# Patient Record
Sex: MALE | Race: WHITE
[De-identification: names, ages, dates, MRNs, and addresses within clinical notes are randomized per-mention and may not be internally consistent; named-entity substitution may affect disease eponyms.]

---

## 2017-07-14 ENCOUNTER — HOSPITAL ENCOUNTER (OUTPATIENT)
Dept: HOSPITAL 61 - PCVCIMAG | Age: 72
Discharge: HOME | End: 2017-07-14
Attending: INTERNAL MEDICINE
Payer: OTHER GOVERNMENT

## 2017-07-14 DIAGNOSIS — I10: ICD-10-CM

## 2017-07-14 DIAGNOSIS — Z79.899: ICD-10-CM

## 2017-07-14 DIAGNOSIS — I74.5: Primary | ICD-10-CM

## 2017-07-14 DIAGNOSIS — Z79.82: ICD-10-CM

## 2017-07-14 DIAGNOSIS — Z87.891: ICD-10-CM

## 2017-07-14 DIAGNOSIS — I25.10: ICD-10-CM

## 2017-07-14 DIAGNOSIS — I71.4: ICD-10-CM

## 2017-07-14 DIAGNOSIS — I25.5: ICD-10-CM

## 2017-07-14 DIAGNOSIS — R00.1: ICD-10-CM

## 2017-07-14 PROCEDURE — 80061 LIPID PANEL: CPT

## 2017-07-14 PROCEDURE — G0463 HOSPITAL OUTPT CLINIC VISIT: HCPCS

## 2017-07-14 PROCEDURE — 93923 UPR/LXTR ART STDY 3+ LVLS: CPT

## 2017-07-14 PROCEDURE — 93978 VASCULAR STUDY: CPT

## 2017-07-14 NOTE — PCVCIMAG
EXAM: AORTOILIAC DUPLEX



INDICATION: Peripheral arterial disease 



FINDINGS: 



AORTA: Suprarenal aorta measures maximum diameter of 2.7 cm. There is

a fusiform infrarenal aortic aneurysm. The infrarenal aorta measures

maximum diameter of 3.0 cm. No aortic stenosis.



RIGHT COMMON ILIAC ARTERY: Maximum diameter is 1.0 cm. No significant

stenosis.



RIGHT EXTERNAL ILIAC ARTERY:  Probable subtotal occlusion proximal

vessel.



LEFT COMMON ILIAC ARTERY: Maximum diameter is 1.0 cm.  No significant

stenosis.



LEFT EXTERNAL ILIAC ARTERY:  No significant stenosis.



IMPRESSION: 

3.0 cm infrarenal abdominal aortic aneurysm.

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

No left iliac stenosis.





LOC:[id number]

## 2017-07-14 NOTE — PCVCIMAG
EXAM: NONINVASIVE ARTERIAL EXAMINATION OF BOTH LOWER EXTREMITIES

INCLUDING PRE AND POST EXERCISE PRESSURE MEASUREMENTS AND DOPPLER

WAVEFORMS



INDICATION: Peripheral Arterial Disease. Leg pain.



FINDINGS: 

Right Brachial: 140 mm Hg.

Right Dorsalis Pedis: 138 mm Hg.

Right Posterior Tibial: 153 mm Hg.

Right EVELYN = 1.09.



Left Brachial: 137 mm Hg.

Left Dorsalis Pedis: 155 mm Hg.

Left Posterior Tibial: 146 mm Hg.

Left EVELYN = 1.11.



Post Exercise:

Right Brachial  155 mm Hg.

Right Posterior Tibial:  158 mm Hg.

Left Dorsalis Pedis:  159 mm Hg.

Right EVELYN =  1.02.

Left EVELYN =  1.03.



IMPRESSION: 

No resting ischemia in the right lower extremity.

No exercise induced ischemia in the right lower extremity.

No resting ischemia in the left lower extremity.

No exercise induced ischemia in the left lower extremity.



These findings indicate the questionable area of subtotal occlusion in

the proximal left external iliac artery seen on aortoiliac duplex

today was artifactual.



LOC:[id number]

## 2018-07-06 ENCOUNTER — HOSPITAL ENCOUNTER (OUTPATIENT)
Dept: HOSPITAL 61 - PCVCIMAG | Age: 73
Discharge: HOME | End: 2018-07-06
Attending: INTERNAL MEDICINE
Payer: OTHER GOVERNMENT

## 2018-07-06 DIAGNOSIS — I10: ICD-10-CM

## 2018-07-06 DIAGNOSIS — I71.4: Primary | ICD-10-CM

## 2018-07-06 DIAGNOSIS — Z95.5: ICD-10-CM

## 2018-07-06 DIAGNOSIS — I25.10: ICD-10-CM

## 2018-07-06 DIAGNOSIS — Z87.891: ICD-10-CM

## 2018-07-06 DIAGNOSIS — I25.2: ICD-10-CM

## 2018-07-06 DIAGNOSIS — J44.9: ICD-10-CM

## 2018-07-06 DIAGNOSIS — R06.09: ICD-10-CM

## 2018-07-06 PROCEDURE — 93978 VASCULAR STUDY: CPT

## 2018-07-06 PROCEDURE — 93351 STRESS TTE COMPLETE: CPT

## 2018-07-06 PROCEDURE — 93325 DOPPLER ECHO COLOR FLOW MAPG: CPT

## 2019-08-06 ENCOUNTER — HOSPITAL ENCOUNTER (OUTPATIENT)
Dept: HOSPITAL 61 - PCVCCLINIC | Age: 74
Discharge: HOME | End: 2019-08-06
Attending: INTERNAL MEDICINE
Payer: OTHER GOVERNMENT

## 2019-08-06 DIAGNOSIS — I25.10: ICD-10-CM

## 2019-08-06 DIAGNOSIS — R53.83: ICD-10-CM

## 2019-08-06 DIAGNOSIS — R06.02: ICD-10-CM

## 2019-08-06 DIAGNOSIS — R07.9: Primary | ICD-10-CM

## 2019-08-06 DIAGNOSIS — R78.5: ICD-10-CM

## 2019-08-06 DIAGNOSIS — R68.84: ICD-10-CM

## 2019-08-06 DIAGNOSIS — I10: ICD-10-CM

## 2019-08-06 DIAGNOSIS — I71.4: ICD-10-CM

## 2019-08-06 DIAGNOSIS — I25.5: ICD-10-CM

## 2019-08-06 DIAGNOSIS — I65.23: ICD-10-CM

## 2019-08-06 PROCEDURE — G0463 HOSPITAL OUTPT CLINIC VISIT: HCPCS

## 2019-08-06 PROCEDURE — 93005 ELECTROCARDIOGRAM TRACING: CPT

## 2019-08-08 ENCOUNTER — HOSPITAL ENCOUNTER (OUTPATIENT)
Dept: HOSPITAL 35 - CATH | Age: 74
Setting detail: OBSERVATION
LOS: 1 days | Discharge: HOME | End: 2019-08-09
Attending: INTERNAL MEDICINE | Admitting: INTERNAL MEDICINE
Payer: OTHER GOVERNMENT

## 2019-08-08 VITALS — DIASTOLIC BLOOD PRESSURE: 76 MMHG | SYSTOLIC BLOOD PRESSURE: 141 MMHG

## 2019-08-08 VITALS — DIASTOLIC BLOOD PRESSURE: 82 MMHG | SYSTOLIC BLOOD PRESSURE: 144 MMHG

## 2019-08-08 VITALS — DIASTOLIC BLOOD PRESSURE: 71 MMHG | SYSTOLIC BLOOD PRESSURE: 134 MMHG

## 2019-08-08 VITALS — DIASTOLIC BLOOD PRESSURE: 79 MMHG | SYSTOLIC BLOOD PRESSURE: 135 MMHG

## 2019-08-08 VITALS — DIASTOLIC BLOOD PRESSURE: 83 MMHG | SYSTOLIC BLOOD PRESSURE: 128 MMHG

## 2019-08-08 VITALS — SYSTOLIC BLOOD PRESSURE: 114 MMHG | DIASTOLIC BLOOD PRESSURE: 94 MMHG

## 2019-08-08 VITALS — SYSTOLIC BLOOD PRESSURE: 159 MMHG | DIASTOLIC BLOOD PRESSURE: 70 MMHG

## 2019-08-08 VITALS — DIASTOLIC BLOOD PRESSURE: 80 MMHG | SYSTOLIC BLOOD PRESSURE: 141 MMHG

## 2019-08-08 VITALS — SYSTOLIC BLOOD PRESSURE: 141 MMHG | DIASTOLIC BLOOD PRESSURE: 82 MMHG

## 2019-08-08 VITALS — BODY MASS INDEX: 27.97 KG/M2 | WEIGHT: 174 LBS | HEIGHT: 65.98 IN

## 2019-08-08 VITALS — DIASTOLIC BLOOD PRESSURE: 91 MMHG | SYSTOLIC BLOOD PRESSURE: 135 MMHG

## 2019-08-08 VITALS — SYSTOLIC BLOOD PRESSURE: 135 MMHG | DIASTOLIC BLOOD PRESSURE: 79 MMHG

## 2019-08-08 VITALS — SYSTOLIC BLOOD PRESSURE: 135 MMHG | DIASTOLIC BLOOD PRESSURE: 96 MMHG

## 2019-08-08 VITALS — SYSTOLIC BLOOD PRESSURE: 147 MMHG | DIASTOLIC BLOOD PRESSURE: 72 MMHG

## 2019-08-08 VITALS — SYSTOLIC BLOOD PRESSURE: 137 MMHG | DIASTOLIC BLOOD PRESSURE: 86 MMHG

## 2019-08-08 VITALS — DIASTOLIC BLOOD PRESSURE: 81 MMHG | SYSTOLIC BLOOD PRESSURE: 154 MMHG

## 2019-08-08 VITALS — DIASTOLIC BLOOD PRESSURE: 86 MMHG | SYSTOLIC BLOOD PRESSURE: 137 MMHG

## 2019-08-08 DIAGNOSIS — I10: ICD-10-CM

## 2019-08-08 DIAGNOSIS — Z95.5: ICD-10-CM

## 2019-08-08 DIAGNOSIS — I71.4: ICD-10-CM

## 2019-08-08 DIAGNOSIS — Z79.82: ICD-10-CM

## 2019-08-08 DIAGNOSIS — I25.10: Primary | ICD-10-CM

## 2019-08-08 DIAGNOSIS — I25.5: ICD-10-CM

## 2019-08-08 DIAGNOSIS — Z79.899: ICD-10-CM

## 2019-08-08 DIAGNOSIS — E78.5: ICD-10-CM

## 2019-08-08 LAB
ANION GAP SERPL CALC-SCNC: 7 MMOL/L (ref 7–16)
BUN SERPL-MCNC: 11 MG/DL (ref 7–18)
CALCIUM SERPL-MCNC: 9.9 MG/DL (ref 8.5–10.1)
CHLORIDE SERPL-SCNC: 104 MMOL/L (ref 98–107)
CO2 SERPL-SCNC: 29 MMOL/L (ref 21–32)
CREAT SERPL-MCNC: 1 MG/DL (ref 0.7–1.3)
ERYTHROCYTE [DISTWIDTH] IN BLOOD BY AUTOMATED COUNT: 12.9 % (ref 10.5–14.5)
GLUCOSE SERPL-MCNC: 116 MG/DL (ref 74–106)
HCT VFR BLD CALC: 42 % (ref 42–52)
HGB BLD-MCNC: 14.6 GM/DL (ref 14–18)
MCH RBC QN AUTO: 31.9 PG (ref 26–34)
MCHC RBC AUTO-ENTMCNC: 34.7 G/DL (ref 28–37)
MCV RBC: 91.9 FL (ref 80–100)
PLATELET # BLD: 199 THOU/UL (ref 150–400)
POTASSIUM SERPL-SCNC: 3.7 MMOL/L (ref 3.5–5.1)
RBC # BLD AUTO: 4.57 MIL/UL (ref 4.5–6)
SODIUM SERPL-SCNC: 140 MMOL/L (ref 136–145)
WBC # BLD AUTO: 8.1 THOU/UL (ref 4–11)

## 2019-08-09 VITALS — DIASTOLIC BLOOD PRESSURE: 70 MMHG | SYSTOLIC BLOOD PRESSURE: 130 MMHG

## 2019-08-09 VITALS — SYSTOLIC BLOOD PRESSURE: 134 MMHG | DIASTOLIC BLOOD PRESSURE: 74 MMHG

## 2019-08-09 VITALS — SYSTOLIC BLOOD PRESSURE: 135 MMHG | DIASTOLIC BLOOD PRESSURE: 77 MMHG

## 2019-08-09 VITALS — DIASTOLIC BLOOD PRESSURE: 74 MMHG | SYSTOLIC BLOOD PRESSURE: 134 MMHG

## 2019-08-09 LAB
ALBUMIN SERPL-MCNC: 3.3 G/DL (ref 3.4–5)
ALT SERPL-CCNC: 30 U/L (ref 30–65)
ANION GAP SERPL CALC-SCNC: 12 MMOL/L (ref 7–16)
AST SERPL-CCNC: 22 U/L (ref 15–37)
BILIRUB SERPL-MCNC: 1.9 MG/DL
BUN SERPL-MCNC: 12 MG/DL (ref 7–18)
CALCIUM SERPL-MCNC: 8.5 MG/DL (ref 8.5–10.1)
CHLORIDE SERPL-SCNC: 107 MMOL/L (ref 98–107)
CO2 SERPL-SCNC: 22 MMOL/L (ref 21–32)
CREAT SERPL-MCNC: 0.9 MG/DL (ref 0.7–1.3)
ERYTHROCYTE [DISTWIDTH] IN BLOOD BY AUTOMATED COUNT: 12.5 % (ref 10.5–14.5)
GLUCOSE SERPL-MCNC: 95 MG/DL (ref 74–106)
HCT VFR BLD CALC: 39 % (ref 42–52)
HGB BLD-MCNC: 13.8 GM/DL (ref 14–18)
MCH RBC QN AUTO: 32.1 PG (ref 26–34)
MCHC RBC AUTO-ENTMCNC: 35.5 G/DL (ref 28–37)
MCV RBC: 90.4 FL (ref 80–100)
PLATELET # BLD: 187 THOU/UL (ref 150–400)
POTASSIUM SERPL-SCNC: 3.6 MMOL/L (ref 3.5–5.1)
PROT SERPL-MCNC: 6.8 G/DL (ref 6.4–8.2)
RBC # BLD AUTO: 4.31 MIL/UL (ref 4.5–6)
SODIUM SERPL-SCNC: 141 MMOL/L (ref 136–145)
TROPONIN I SERPL-MCNC: 0.08 NG/ML (ref ?–0.06)
WBC # BLD AUTO: 8.7 THOU/UL (ref 4–11)

## 2019-08-09 NOTE — EKG
[place] [address] [facility] [place]  [address]
Phone:  [phone]                    ELECTROCARDIOGRAM REPORT      
_______________________________________________________________________________
 
Name:       DARLENE SUERO                 Room #:         217-[facility].R.#:      [id number]     Account #:      [id number]  
Admission:  19    Attend Phys:    Thai Morgan MD,
Discharge:  19    Date of Birth:  [birth year]  
                                                          Report #: [id number]
   [id number]
_______________________________________________________________________________
THIS REPORT FOR:   //name//                          
 
                          [facility]
                                       
Test Date:    2019               Test Time:    07:46:33
Pat Name:     DARLENE SUERO              Department:   
Patient ID:   [id number]            Room:         217 
Gender:       M                        Technician:   [email or web address]
:          1945               Requested By: Thai Morgan
Order Number: [id number] MD:   Chester Manuel
                                 Measurements
Intervals                              Axis          
Rate:         54                       P:            -7
DC:           172                      QRS:          25
QRSD:         93                       T:            22
QT:           456                                    
QTc:          433                                    
                           Interpretive Statements
Sinus rhythm
Compared to ECG 2011 07:36:49
Myocardial infarct finding no longer present
 
Electronically Signed On 2019 13:43:58 CDT by Chester Manuel
[email or web address]
 
 
 
 
 
 
 
 
 
 
 
 
 
 
 
 
 
 
 
  <ELECTRONICALLY SIGNED>
   By: Chester Manuel MD               
  19     1343
D: 08746                           _____________________________________
T: 19                           Chester Manuel MD                 /MARCY

## 2019-08-13 NOTE — CATHLAB
[facility]
[address]
[place]  [address]
Phone:  [phone]                    INVASIVE PROCEDURE REPORT     
_______________________________________________________________________________
 
Name:            DARLENE SUERO                 Room #:        217-P       [facility] [place]
M.R.#:           [id number]          Account #:     [id number]  
Admission:       08/08/19         Attend Phys:   Thai Morgan,
Discharge:    08/09/19      Date of Birth: [birth year]  
Date of Service: 08/13/19 1703               Report #:      [id number]
        [id number]
_______________________________________________________________________________
THIS REPORT FOR:   //name//                          
 
 
--------------- APPROVED REPORT --------------
 
 
Study performed:  08/08/2019 07:46:39
 
Patient Details
The patient is a 73 year-old male
 
Event Personnel
Thai Morgan  Interventional Cardiologist, Sylvester Franklin RTR 
Hermelinda Rooney Jeff RN RN, Bonnie Clements RTR, RCIS 
Monitor
 
Procedures Performed
Left Heart Cath w/or w/o Coronaries [id number] [facility] w/wo Plasty 
Single LAD [id number] Renal Bilateral Peripheral Angiography [id number] 
CVRENALBIL
 
Indication
Chest pain
 
Procedure Narrative
The Right Groin^ was infiltrated with 1% Lidocaine subcutaneous 
anesthesia. A PINNACLE 6FR TIF Sheath #[id number] sheath was inserted 
into the RFA^. Coronary angiography was performed using coronary 
diagnostic catheters. The right coronary system was accessed and 
visualized with a JR4 catheter. The left coronary system was accessed 
and visualized with a JL4 catheter. The left ventricle was accessed 
and visualized with a PIGTAIL catheter. Left ventriculogram was 
performed in CHIANG projection. An aortogram of the abdominal aorta was 
performed. Closure device was deployed with a Fr MYNXGRIP 6/7F 
#[id number]. The patient tolerated the procedure well and there were no 
complications associated with the procedure. 
 
Intraoperative Conscious Sedation
Sedation start time:  0825           Case end Time:  
0930    
Fentanyl  50 mcg    Versed  1 mg  
 
Fluoro Time:    10.50 minutes     
Dose:     DAP 39279.20 cGycm2  1342 mGy  
Contrast Type and Amount:  Omnipaque 170 ml    
 
 
 
[facility]
[address]
[place]  [address]
Phone:  [phone]                    INVASIVE PROCEDURE REPORT     
_______________________________________________________________________________
 
Name:            DARLENE SUERO                  Room #:        217-P       [facility] [place]
M.R.#:           [id number]          Account #:     [id number]  
Admission:       08/08/19         Attend Phys:   Thai Morgan,
Discharge:    08/09/19      Date of Birth: [birth year]  
Date of Service: 08/13/19 1703               Report #:      [id number]
        [id number]
_______________________________________________________________________________
Hemodynamics
The aortic pressure is 154/57 mmHg with a mean of 101 mmHg. The left 
ventricular pressure is 157/15 mmHg with a mean of mmHg. The left 
ventricular end diastolic pressure is 29 mmHg. 
 
PCI Technique Lesion
Anticoagulation was achieved with Heparin. Percutaneous coronary 
intervention was performed on the proximal left anterior descending 
artery segment. A LAUNCHER 6FR EBU 3.5 #[id number] Guide Catheter was 
used to engage the ostium. A Luge Wire .014 x 182CM #[id number] 
Interventional Guidewire was used to cross the lesion.
 
BALLOON DILATION
A Balloon catheter Sprinter OTW 2.25 x 12 #[id number] was inserted and 
inflated up to 8.00atm for 15seconds. Additional Inflation: 8.00atm 
for 10seconds. Additional Inflation: 8.00atm for 11seconds.
 
STENT DEPLOYMENT
A stent RESOLUTE YU OTW 2.25 X 12 #[id number] was inserted and inflated 
up to 14.00atm for 31seconds.
 
Conclusion
#1 normal left ventricular size and systolic function EF 55%
#2 abdominal aortogram revealing a small infrarenal bilobed aortic 
aneurysm we will evaluate noninvasively moderate calcification and 
iliac disease noted
#3 successful PTCA stent of the proximal LAD with a 2.25 x 12 [place] 
resolute stent postdilated 2.4 mm ELIJAH grade 3 flow and a type I LAD 
with no residual
#4 left main mildly disease giving rise to LAD and circumflex
#5 circumflex small nondominant no occlusive disease
#6 large dominant right coronary R with prior multiple stents with 
only mild in-stent restenosis extensively filling the inferior and 
provides some competitive filling to the occluded LAD prior to its 
intervention.
 
Recommendations and plan: Continue aggressive risk factor 
modification dual antiplatelet therapy. Transfer to CCU in stable 
condition.
 
 
 
 
 
  <ELECTRONICALLY SIGNED>
   By: Thai Morgan MD, [facility]   
  08/13/19 1703
D: 08/13/19 1703                           _____________________________________
T: 08/13/19 1703                           Thai Morgan MD, FAC     /INF

## 2019-09-04 ENCOUNTER — HOSPITAL ENCOUNTER (OUTPATIENT)
Dept: HOSPITAL 61 - PCVCIMAG | Age: 74
Discharge: HOME | End: 2019-09-04
Attending: INTERNAL MEDICINE
Payer: OTHER GOVERNMENT

## 2019-09-04 DIAGNOSIS — Z79.899: ICD-10-CM

## 2019-09-04 DIAGNOSIS — I65.23: ICD-10-CM

## 2019-09-04 DIAGNOSIS — I71.4: Primary | ICD-10-CM

## 2019-09-04 DIAGNOSIS — I25.5: ICD-10-CM

## 2019-09-04 DIAGNOSIS — Z87.891: ICD-10-CM

## 2019-09-04 DIAGNOSIS — I25.10: ICD-10-CM

## 2019-09-04 DIAGNOSIS — I10: ICD-10-CM

## 2019-09-04 DIAGNOSIS — E78.5: ICD-10-CM

## 2019-09-04 DIAGNOSIS — Z79.82: ICD-10-CM

## 2019-09-04 PROCEDURE — 93978 VASCULAR STUDY: CPT

## 2019-09-04 NOTE — PCVCIMAG
EXAM: AORTOILIAC DUPLEX



INDICATION: Abdominal aortic aneurysm. 



FINDINGS: 



AORTA: Suprarenal aorta measures maximum diameter of 3.0 cm. There is

a fusiform infrarenal aortic aneurysm. The infrarenal aorta measures

maximum diameter of 2.8 x 3.1 cm. No aortic stenosis.



RIGHT COMMON ILIAC ARTERY: Maximum diameter is 0.9 cm. No significant

stenosis.



RIGHT EXTERNAL ILIAC ARTERY:  No significant stenosis.



LEFT COMMON ILIAC ARTERY: Maximum diameter is 0.9 cm.  No significant

stenosis.



LEFT EXTERNAL ILIAC ARTERY:  No significant stenosis.



IMPRESSION: 

3.1 cm infrarenal abdominal aortic aneurysm compared to 3.0 cm on July 2018 study.





LOC:[id number]

## 2020-07-20 ENCOUNTER — HOSPITAL ENCOUNTER (OUTPATIENT)
Dept: HOSPITAL 35 - SJCVCIMAG | Age: 75
End: 2020-07-20
Attending: INTERNAL MEDICINE
Payer: OTHER GOVERNMENT

## 2020-07-20 DIAGNOSIS — R00.1: Primary | ICD-10-CM

## 2020-07-20 DIAGNOSIS — Z98.61: ICD-10-CM

## 2020-07-20 DIAGNOSIS — E78.5: ICD-10-CM

## 2020-07-20 DIAGNOSIS — I10: ICD-10-CM

## 2020-07-20 DIAGNOSIS — R53.83: ICD-10-CM

## 2020-07-20 DIAGNOSIS — I25.10: ICD-10-CM

## 2021-04-09 ENCOUNTER — HOSPITAL ENCOUNTER (OUTPATIENT)
Dept: HOSPITAL 35 - SJCVC | Age: 76
End: 2021-04-09
Attending: INTERNAL MEDICINE
Payer: OTHER GOVERNMENT

## 2021-04-09 DIAGNOSIS — I65.23: ICD-10-CM

## 2021-04-09 DIAGNOSIS — Z87.891: ICD-10-CM

## 2021-04-09 DIAGNOSIS — Z79.82: ICD-10-CM

## 2021-04-09 DIAGNOSIS — Z79.899: ICD-10-CM

## 2021-04-09 DIAGNOSIS — I10: Primary | ICD-10-CM

## 2021-04-09 DIAGNOSIS — I25.10: ICD-10-CM

## 2021-04-09 DIAGNOSIS — E78.00: ICD-10-CM

## 2021-04-09 DIAGNOSIS — I71.4: ICD-10-CM

## 2021-04-09 DIAGNOSIS — R53.83: ICD-10-CM

## 2021-04-09 DIAGNOSIS — I42.9: ICD-10-CM
